# Patient Record
Sex: MALE | Race: WHITE | NOT HISPANIC OR LATINO | URBAN - METROPOLITAN AREA
[De-identification: names, ages, dates, MRNs, and addresses within clinical notes are randomized per-mention and may not be internally consistent; named-entity substitution may affect disease eponyms.]

---
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
## 2024-12-08 ENCOUNTER — OFFICE VISIT (OUTPATIENT)
Dept: URGENT CARE | Facility: CLINIC | Age: 24
End: 2024-12-08
Payer: COMMERCIAL

## 2024-12-08 VITALS
OXYGEN SATURATION: 97 % | HEART RATE: 112 BPM | BODY MASS INDEX: 34.2 KG/M2 | SYSTOLIC BLOOD PRESSURE: 157 MMHG | WEIGHT: 280.88 LBS | HEIGHT: 76 IN | RESPIRATION RATE: 18 BRPM | TEMPERATURE: 102 F | DIASTOLIC BLOOD PRESSURE: 88 MMHG

## 2024-12-08 DIAGNOSIS — J10.1 INFLUENZA A: Primary | ICD-10-CM

## 2024-12-08 DIAGNOSIS — R50.9 FEVER, UNSPECIFIED FEVER CAUSE: ICD-10-CM

## 2024-12-08 DIAGNOSIS — R05.1 ACUTE COUGH: ICD-10-CM

## 2024-12-08 LAB
CTP QC/QA: YES
POC MOLECULAR INFLUENZA A AGN: POSITIVE
POC MOLECULAR INFLUENZA B AGN: NEGATIVE

## 2024-12-08 PROCEDURE — 99203 OFFICE O/P NEW LOW 30 MIN: CPT | Mod: S$GLB,,, | Performed by: PHYSICIAN ASSISTANT

## 2024-12-08 PROCEDURE — 87502 INFLUENZA DNA AMP PROBE: CPT | Mod: QW,S$GLB,, | Performed by: PHYSICIAN ASSISTANT

## 2024-12-08 RX ORDER — IBUPROFEN 200 MG
600 TABLET ORAL
Status: COMPLETED | OUTPATIENT
Start: 2024-12-08 | End: 2024-12-08

## 2024-12-08 RX ORDER — PROMETHAZINE HYDROCHLORIDE AND DEXTROMETHORPHAN HYDROBROMIDE 6.25; 15 MG/5ML; MG/5ML
5 SYRUP ORAL EVERY 6 HOURS PRN
Qty: 120 ML | Refills: 0 | Status: SHIPPED | OUTPATIENT
Start: 2024-12-08

## 2024-12-08 RX ORDER — SERTRALINE HYDROCHLORIDE 100 MG/1
100 TABLET, FILM COATED ORAL DAILY
COMMUNITY

## 2024-12-08 RX ORDER — OSELTAMIVIR PHOSPHATE 75 MG/1
75 CAPSULE ORAL 2 TIMES DAILY
Qty: 10 CAPSULE | Refills: 0 | Status: SHIPPED | OUTPATIENT
Start: 2024-12-08 | End: 2024-12-13

## 2024-12-08 RX ADMIN — Medication 600 MG: at 04:12

## 2024-12-08 NOTE — LETTER
December 8, 2024      [facility]  [address]  [facility] [address]  Phone: [phone]       Patient: Quan Rosado   YOB: 2000  Date of Visit: 12/08/2024    To Whom It May Concern:    Anna Rosado  was at [facility] on 12/08/2024. The patient may return to work/school on 12/11/24 with no restrictions. If you have any questions or concerns, or if I can be of further assistance, please do not hesitate to contact me.    Sincerely,    Amalia Villalobos PA-C

## 2024-12-08 NOTE — PROGRESS NOTES
"Subjective:      Patient ID: Quan Rosado is a 24 y.o. male.    Vitals:  height is 6' 4" (1.93 m) and weight is 127.4 kg (280 lb 13.9 oz). His oral temperature is 102.1 °F (38.9 °C) (abnormal). His blood pressure is 157/88 (abnormal) and his pulse is 112 (abnormal). His respiration is 18 and oxygen saturation is 97%.     Chief Complaint: Cough    Pt presents to the clinic today with cough, congestion, sneezing, and fever.  States about 1 week ago he began to have sinus congestion/pressure and felt feverish for a couple days but then symptoms improved after taking OTC medications.  Patient states that he felt a lot better for few days and then about 2 days ago started to have cough, sore throat and fever.  Took DayQuil this afternoon with little relief.  Denies chest pain, wheezing, shortness of breath, n/v/d.     Cough  This is a new problem. The current episode started in the past 7 days. The problem has been gradually worsening. The problem occurs constantly. The cough is Productive of sputum. Associated symptoms include ear pain, a fever, headaches, nasal congestion, postnasal drip, a sore throat and sweats. Pertinent negatives include no chest pain, chills, ear congestion, heartburn, hemoptysis, myalgias, rash, rhinorrhea, shortness of breath, weight loss or wheezing. Nothing aggravates the symptoms. Treatments tried: Dayquil. The treatment provided no relief. There is no history of asthma, bronchiectasis, bronchitis, COPD, emphysema, environmental allergies or pneumonia.       Constitution: Positive for fever. Negative for chills.   HENT:  Positive for ear pain, congestion, postnasal drip and sore throat.    Neck: neck negative.   Cardiovascular:  Negative for chest pain, palpitations and sob on exertion.   Respiratory:  Positive for cough and sputum production. Negative for bloody sputum, shortness of breath and wheezing.    Gastrointestinal: Negative.  Negative for heartburn.   Musculoskeletal:  Negative for " muscle ache.   Skin:  Negative for rash.   Allergic/Immunologic: Negative for environmental allergies.   Neurological:  Positive for headaches. Negative for dizziness.      Objective:     Physical Exam   Constitutional: He appears well-developed.  Non-toxic appearance. He does not appear ill. No distress.   HENT:   Head: Normocephalic and atraumatic.   Ears:   Right Ear: Tympanic membrane, external ear and ear canal normal.   Left Ear: Tympanic membrane, external ear and ear canal normal.   Nose: Rhinorrhea and congestion present.   Mouth/Throat: Uvula is midline and mucous membranes are normal. Mucous membranes are moist. Posterior oropharyngeal erythema present. No oropharyngeal exudate. No tonsillar exudate.   Eyes: Conjunctivae and EOM are normal.   Neck: Neck supple.   Cardiovascular: Regular rhythm. Tachycardia present.   Pulmonary/Chest: Effort normal and breath sounds normal.   Abdominal: Normal appearance.   Musculoskeletal: Normal range of motion.         General: Normal range of motion.   Lymphadenopathy:     He has no cervical adenopathy.   Neurological: no focal deficit. He is alert. He displays no weakness. Gait normal.   Skin: Skin is warm, dry, not diaphoretic, not pale and no rash.   Psychiatric: His behavior is normal.     Results for orders placed or performed in visit on 12/08/24   POCT Influenza A/B MOLECULAR    Collection Time: 12/08/24  4:40 PM   Result Value Ref Range    POC Molecular Influenza A Ag Positive (A) Negative    POC Molecular Influenza B Ag Negative Negative     Acceptable Yes          Assessment:     1. Influenza A    2. Fever, unspecified fever cause    3. Acute cough        Plan:     Positive for Flu A.  Xofluza not covered by pt's insurance. Discussed with patient that Tamiflu is optional and also discussed common side effects. Pt opted for Tamiflu.  Fever control with Tylenol or ibuprofen.  Promethazine DM as needed for cough.  Stay home until fever free for 24  hours without medications.  Over-the-counter expectorant as needed.   Rest and drink plenty of fluids. Follow-up with PCP if symptoms worsen or fail to improve.     Influenza A  -     oseltamivir (TAMIFLU) 75 MG capsule; Take 1 capsule (75 mg total) by mouth 2 (two) times daily. for 5 days  Dispense: 10 capsule; Refill: 0    Fever, unspecified fever cause  -     POCT Influenza A/B MOLECULAR  -     ibuprofen tablet 600 mg    Acute cough  -     promethazine-dextromethorphan (PROMETHAZINE-DM) 6.25-15 mg/5 mL Syrp; Take 5 mLs by mouth every 6 (six) hours as needed (cough). May cause drowsiness.  Dispense: 120 mL; Refill: 0